# Patient Record
Sex: MALE | Race: BLACK OR AFRICAN AMERICAN
[De-identification: names, ages, dates, MRNs, and addresses within clinical notes are randomized per-mention and may not be internally consistent; named-entity substitution may affect disease eponyms.]

---

## 2020-09-17 ENCOUNTER — HOSPITAL ENCOUNTER (OUTPATIENT)
Dept: HOSPITAL 67 - RESP | Age: 60
Discharge: HOME | End: 2020-09-17
Attending: INTERNAL MEDICINE
Payer: COMMERCIAL

## 2020-09-17 DIAGNOSIS — R94.31: ICD-10-CM

## 2020-09-17 DIAGNOSIS — R06.02: ICD-10-CM

## 2020-09-17 DIAGNOSIS — R74.0: ICD-10-CM

## 2020-09-17 DIAGNOSIS — R07.9: Primary | ICD-10-CM

## 2020-09-17 DIAGNOSIS — R01.1: ICD-10-CM

## 2020-10-13 ENCOUNTER — HOSPITAL ENCOUNTER (OUTPATIENT)
Dept: HOSPITAL 67 - RESP | Age: 60
Discharge: HOME | End: 2020-10-13
Attending: INTERNAL MEDICINE
Payer: COMMERCIAL

## 2020-10-13 DIAGNOSIS — R94.31: Primary | ICD-10-CM

## 2020-10-13 DIAGNOSIS — R07.9: ICD-10-CM

## 2021-03-17 ENCOUNTER — HOSPITAL ENCOUNTER (OUTPATIENT)
Dept: HOSPITAL 67 - INF | Age: 61
Discharge: HOME | End: 2021-03-17
Attending: INTERNAL MEDICINE
Payer: COMMERCIAL

## 2021-03-17 DIAGNOSIS — Z23: Primary | ICD-10-CM

## 2021-03-17 PROCEDURE — 96372 THER/PROPH/DIAG INJ SC/IM: CPT

## 2021-04-08 ENCOUNTER — HOSPITAL ENCOUNTER (OUTPATIENT)
Dept: HOSPITAL 67 - INF | Age: 61
Discharge: HOME | End: 2021-04-08
Attending: INTERNAL MEDICINE
Payer: COMMERCIAL

## 2021-04-08 DIAGNOSIS — Z23: Primary | ICD-10-CM

## 2021-05-10 ENCOUNTER — HOSPITAL ENCOUNTER (OUTPATIENT)
Dept: HOSPITAL 67 - MED/SURG | Age: 61
Setting detail: OBSERVATION
LOS: 1 days | Discharge: HOME | End: 2021-05-11
Attending: FAMILY MEDICINE | Admitting: FAMILY MEDICINE
Payer: COMMERCIAL

## 2021-05-10 VITALS — TEMPERATURE: 98.3 F | DIASTOLIC BLOOD PRESSURE: 70 MMHG | SYSTOLIC BLOOD PRESSURE: 166 MMHG

## 2021-05-10 VITALS — WEIGHT: 206.25 LBS | HEIGHT: 70 IN | BODY MASS INDEX: 29.53 KG/M2

## 2021-05-10 DIAGNOSIS — R07.89: Primary | ICD-10-CM

## 2021-05-10 DIAGNOSIS — I10: ICD-10-CM

## 2021-05-10 DIAGNOSIS — E11.9: ICD-10-CM

## 2021-05-10 DIAGNOSIS — Z20.822: ICD-10-CM

## 2021-05-10 DIAGNOSIS — R06.09: ICD-10-CM

## 2021-05-10 DIAGNOSIS — K21.9: ICD-10-CM

## 2021-05-10 DIAGNOSIS — R10.9: ICD-10-CM

## 2021-05-10 LAB
APTT BLD: 25 SECONDS (ref 24.5–33.6)
PLATELET # BLD: 139 K/UL (ref 142–355)
POTASSIUM SERPL-SCNC: 3.3 MMOL/L (ref 3.6–5.2)
SODIUM SERPL-SCNC: 137 MMOL/L (ref 136–145)

## 2021-05-10 PROCEDURE — G0378 HOSPITAL OBSERVATION PER HR: HCPCS

## 2021-05-10 PROCEDURE — 82746 ASSAY OF FOLIC ACID SERUM: CPT

## 2021-05-10 PROCEDURE — 36415 COLL VENOUS BLD VENIPUNCTURE: CPT

## 2021-05-10 PROCEDURE — 80053 COMPREHEN METABOLIC PANEL: CPT

## 2021-05-10 PROCEDURE — G0379 DIRECT REFER HOSPITAL OBSERV: HCPCS

## 2021-05-10 PROCEDURE — 83540 ASSAY OF IRON: CPT

## 2021-05-10 PROCEDURE — 80074 ACUTE HEPATITIS PANEL: CPT

## 2021-05-10 PROCEDURE — 82652 VIT D 1 25-DIHYDROXY: CPT

## 2021-05-10 PROCEDURE — 82306 VITAMIN D 25 HYDROXY: CPT

## 2021-05-10 PROCEDURE — 94760 N-INVAS EAR/PLS OXIMETRY 1: CPT

## 2021-05-10 PROCEDURE — 83735 ASSAY OF MAGNESIUM: CPT

## 2021-05-10 PROCEDURE — 82550 ASSAY OF CK (CPK): CPT

## 2021-05-10 PROCEDURE — 84425 ASSAY OF VITAMIN B-1: CPT

## 2021-05-10 PROCEDURE — 85027 COMPLETE CBC AUTOMATED: CPT

## 2021-05-10 PROCEDURE — 99220: CPT

## 2021-05-10 PROCEDURE — 82728 ASSAY OF FERRITIN: CPT

## 2021-05-10 PROCEDURE — 84100 ASSAY OF PHOSPHORUS: CPT

## 2021-05-10 PROCEDURE — 85610 PROTHROMBIN TIME: CPT

## 2021-05-10 PROCEDURE — 84484 ASSAY OF TROPONIN QUANT: CPT

## 2021-05-10 PROCEDURE — 82607 VITAMIN B-12: CPT

## 2021-05-10 PROCEDURE — 84446 ASSAY OF VITAMIN E: CPT

## 2021-05-10 PROCEDURE — 83036 HEMOGLOBIN GLYCOSYLATED A1C: CPT

## 2021-05-10 PROCEDURE — 85730 THROMBOPLASTIN TIME PARTIAL: CPT

## 2021-05-10 PROCEDURE — 93005 ELECTROCARDIOGRAM TRACING: CPT

## 2021-05-10 PROCEDURE — 83550 IRON BINDING TEST: CPT

## 2021-05-10 NOTE — NUR
PATIENT DIRECT ADMIT FROM  OFFICE BROUGHT VIA WHEELCHAIR. PATIENT
TAKEN TO ROOM 1117. PATIENT ORIENTED AND CALL LIGHT. FAMILY IS AT THE BEDSIDE.
BED LOCKED IN LOW POSITION, CALL BELL WITHIN REACH.

## 2021-05-10 NOTE — NUR
CALLED AND SPOKE WITH DR. CURTIS. ORDERS GIVEN FOR: BENADRYL 25MG IV X ONE
DOSE NOW; GI COCKTAIL X ONE DOSE NOW. LAB WORK AND X-RAYS REPORTED TO HER
ALSO. FURTHER ORDERS GIVEN FOR: VITAMIN D 5,000 DAILY TO START IN THE AM;
START ELECTROLYE PROTOCOL FOR HYPOKALEMIA; REPEAT CBC, CMP, MG AND PHOSPHORUS
WITH AM CARDIACS. ALL ORDERS READ BACK AND VERIFIED BY DR. CURTIS. ALL
MEDICATION ORDERS FAXED TO PHARMACY.

## 2021-05-10 NOTE — NUR
ENTERED PATIENT'S ROOM AT THIS TIME. PATIENT SITTING UP IN BED. HE STATES THAT
HE NEEDS SOMETHING TO HELP HIM REST TONIGHT. HE HASN'T BEEN RESTING LATELY. I
ASKED IF HE FEELS NERVOUS OR ANXIOUS- HE STATES YES. WHEN ASKED ABOUT PAIN, HE
POINTS TO THE MID-ABDOMINAL AND EPIGASTRIC AREA. HE REPORTS FEELING TIGHTNESS
AND WHAT FEELS LIKE GAS. HE GETS NAUSEOUS FROM TIME TO TIME. WATER AND ICE
GIVEN TO PATIENT- ENCOURAGED TO TAKE SMALL SIPS AT A TIME. I INFORMED PATIENT
THAT I WOULD CHECK WITH DR. CURTIS REGARDING SOMETHING TO HELP HIME SLEEP. HE
VERBALIZED UNDERSTANDING OF THE ABOVE. AGAIN EDUCATED ABOUT CALL LIGHT AND
SURROUNDINGS. CALL LIGHT WITHIN REACH.

## 2021-05-11 VITALS — SYSTOLIC BLOOD PRESSURE: 164 MMHG | DIASTOLIC BLOOD PRESSURE: 80 MMHG | TEMPERATURE: 97.8 F

## 2021-05-11 VITALS — SYSTOLIC BLOOD PRESSURE: 149 MMHG | DIASTOLIC BLOOD PRESSURE: 74 MMHG | TEMPERATURE: 97.8 F

## 2021-05-11 VITALS — SYSTOLIC BLOOD PRESSURE: 151 MMHG | TEMPERATURE: 98.3 F | DIASTOLIC BLOOD PRESSURE: 66 MMHG

## 2021-05-11 VITALS — DIASTOLIC BLOOD PRESSURE: 59 MMHG | SYSTOLIC BLOOD PRESSURE: 141 MMHG | TEMPERATURE: 98.2 F

## 2021-05-11 VITALS — DIASTOLIC BLOOD PRESSURE: 81 MMHG | TEMPERATURE: 98.3 F | SYSTOLIC BLOOD PRESSURE: 172 MMHG

## 2021-05-11 LAB
PLATELET # BLD: 126 K/UL (ref 142–355)
POTASSIUM SERPL-SCNC: 3.9 MMOL/L (ref 3.6–5.2)
SODIUM SERPL-SCNC: 137 MMOL/L (ref 136–145)

## 2021-05-11 NOTE — NUR
CONTACTED PCP ABOUT NITRO ADMINISTRATION TO CLARIFY IF ADMINISTRATION SHOULD
BE CONTINUED. PT STATED THAT HE HAS HAD NO CHEST PAIN BUT IT IS A LITTLE
TIGHT. WHEN ASKED IF IT FEELS LIKE PRESSURE OR HARD TO BREATH, PT STATED THAT
IT IS HARD TO BREATHE AND THAT IT COMES AND GOES BUT HAS BEEN BETTER WHILE IN
THE HOSPITAL. WRITER ALSO INFORMED PCP THAT PT IS EXPERIENCING DIZZINESS WHEN
GETTING OUT OF THE BED. PCP HAD PT ROTATE HIS HEAD QUICKLY PT STATED
THAT IT ONLY MADE HIM A LITTLE DIZZY. THEN HE ROTATED HIS HEAD QUICKLY AND
STOOD UP,  PT'S EYES BEGAN TO TWITCH SLIGHTLY AND PT SEEMED DIZZY BUT DENIED
THAT HE WAS. ORTHOSTATICS WERE PERFORMED WITHOUT ANY SIGNIFICANT RESULTS THAT
WOULD INDICATE ORTHOSTATIC HYPOTENSION.

## 2021-05-11 NOTE — NUR
PATIENT RESTING QUIETLY IN BED WITH EYES CLOSED. NAD NOTED. RESPIRATIONS EVEN
AND UNLABORED. HE STATES THAT HE IS RESTING WELL AND DENIES ANY PAIN AT THIS
TIME. NITROGLYCERIN NOT GIVEN AT THIS TIME. BED LOCKED AND IN LOWEST POSITION.
CALL LIGHT WITHIN REACH.

## 2021-05-11 NOTE — NUR
IN TO CHECK ON PATIENT. HE IS RESTING QUIETLY IN BED WITH EYES CLOSED. HE
STATES, "MY CHEST IS NOT HURTING, I JUST FEEL LIKE I NEED TO CLEAR IT."
PATIENT COUGHING AND STATES THAT IT HELPS SOME. NAD NOTED. PREVIOUS EKG SHOWED
SINUS BRADYCARDIA. NITROGLYCERIN NOT GIVEN AT THIS TIME. BED LOCKED AND IN
LOWEST POSITION. CALL LIGHT WITHIN REACH.

## 2021-05-11 NOTE — NUR
DISCHARGE INSTRUCTIONS WERE GIVEN TO PT AND PT'S DAUGHTER. BOTH VERBALIZED
UNDERSTANDING OF DISCHARGE INSTRUCTIONS. PT WAS DISCHARGED FROM THE HOSPITAL
VIA WHEELCHAIR TO DAUGHTER'S PERSONAL VEHICLE. IV WAS REMOVED BEFORE DISMISSAL
WITHOUT DIFFICULTY AND WITH CATHETER STILL INTACT. NAD WAS NOTED DURING
DISCHARGE FROM THE HOSPITAL.

## 2021-05-11 NOTE — NUR
PT EXPRESSED COMPLAINT ABOUT FEELING NAUSEATED, PRN PROMETHAZINE HAS BEEN
ADMINISTERED AND PT IS CURRENTLY ASLEEP.

## 2021-05-12 ENCOUNTER — HOSPITAL ENCOUNTER (OUTPATIENT)
Dept: HOSPITAL 67 - US | Age: 61
Discharge: HOME | End: 2021-05-12
Attending: FAMILY MEDICINE
Payer: COMMERCIAL

## 2021-05-12 DIAGNOSIS — R10.13: Primary | ICD-10-CM

## 2021-06-16 ENCOUNTER — HOSPITAL ENCOUNTER (OUTPATIENT)
Dept: HOSPITAL 67 - OR | Age: 61
Discharge: HOME | End: 2021-06-16
Attending: INTERNAL MEDICINE
Payer: COMMERCIAL

## 2021-06-16 VITALS — HEIGHT: 60 IN | BODY MASS INDEX: 0.19 KG/M2 | WEIGHT: 1 LBS

## 2021-06-16 DIAGNOSIS — K29.50: ICD-10-CM

## 2021-06-16 DIAGNOSIS — R11.2: ICD-10-CM

## 2021-06-16 DIAGNOSIS — Z20.822: ICD-10-CM

## 2021-06-16 DIAGNOSIS — K21.00: Primary | ICD-10-CM

## 2021-06-16 DIAGNOSIS — R19.7: ICD-10-CM

## 2021-06-16 PROCEDURE — 0DB88ZZ EXCISION OF SMALL INTESTINE, VIA NATURAL OR ARTIFICIAL OPENING ENDOSCOPIC: ICD-10-PCS | Performed by: INTERNAL MEDICINE

## 2021-06-16 PROCEDURE — 87635 SARS-COV-2 COVID-19 AMP PRB: CPT

## 2021-06-16 PROCEDURE — U0003 INFECTIOUS AGENT DETECTION BY NUCLEIC ACID (DNA OR RNA); SEVERE ACUTE RESPIRATORY SYNDROME CORONAVIRUS 2 (SARS-COV-2) (CORONAVIRUS DISEASE [COVID-19]), AMPLIFIED PROBE TECHNIQUE, MAKING USE OF HIGH THROUGHPUT TECHNOLOGIES AS DESCRIBED BY CMS-2020-01-R: HCPCS

## 2021-06-16 PROCEDURE — 0DB68ZZ EXCISION OF STOMACH, VIA NATURAL OR ARTIFICIAL OPENING ENDOSCOPIC: ICD-10-PCS | Performed by: INTERNAL MEDICINE

## 2021-07-21 ENCOUNTER — HOSPITAL ENCOUNTER (OUTPATIENT)
Dept: HOSPITAL 67 - NM | Age: 61
Discharge: HOME | End: 2021-07-21
Attending: INTERNAL MEDICINE
Payer: COMMERCIAL

## 2021-07-21 DIAGNOSIS — R11.0: Primary | ICD-10-CM

## 2021-07-21 PROCEDURE — A9541 TC99M SULFUR COLLOID: HCPCS

## 2021-11-17 ENCOUNTER — HOSPITAL ENCOUNTER (INPATIENT)
Dept: HOSPITAL 67 - ED | Age: 61
LOS: 1 days | Discharge: ANOTHER HOSPITAL | DRG: 395 | End: 2021-11-18
Attending: FAMILY MEDICINE | Admitting: FAMILY MEDICINE
Payer: COMMERCIAL

## 2021-11-17 VITALS — TEMPERATURE: 98.9 F

## 2021-11-17 VITALS — SYSTOLIC BLOOD PRESSURE: 158 MMHG | DIASTOLIC BLOOD PRESSURE: 61 MMHG | TEMPERATURE: 101 F

## 2021-11-17 VITALS — HEIGHT: 70 IN | WEIGHT: 207.19 LBS | BODY MASS INDEX: 29.66 KG/M2 | BODY MASS INDEX: 29.66 KG/M2

## 2021-11-17 DIAGNOSIS — I10: ICD-10-CM

## 2021-11-17 DIAGNOSIS — K59.00: ICD-10-CM

## 2021-11-17 DIAGNOSIS — K21.9: ICD-10-CM

## 2021-11-17 DIAGNOSIS — R73.03: ICD-10-CM

## 2021-11-17 DIAGNOSIS — K61.1: Primary | ICD-10-CM

## 2021-11-17 DIAGNOSIS — E78.49: ICD-10-CM

## 2021-11-17 DIAGNOSIS — R50.9: ICD-10-CM

## 2021-11-17 DIAGNOSIS — K57.30: ICD-10-CM

## 2021-11-17 LAB
PLATELET # BLD: 197 K/UL (ref 142–355)
POTASSIUM SERPL-SCNC: 3.5 MMOL/L (ref 3.6–5.2)
SODIUM SERPL-SCNC: 131 MMOL/L (ref 136–145)

## 2021-11-17 PROCEDURE — 80053 COMPREHEN METABOLIC PANEL: CPT

## 2021-11-17 PROCEDURE — U0003 INFECTIOUS AGENT DETECTION BY NUCLEIC ACID (DNA OR RNA); SEVERE ACUTE RESPIRATORY SYNDROME CORONAVIRUS 2 (SARS-COV-2) (CORONAVIRUS DISEASE [COVID-19]), AMPLIFIED PROBE TECHNIQUE, MAKING USE OF HIGH THROUGHPUT TECHNOLOGIES AS DESCRIBED BY CMS-2020-01-R: HCPCS

## 2021-11-17 PROCEDURE — 87040 BLOOD CULTURE FOR BACTERIA: CPT

## 2021-11-17 PROCEDURE — 83036 HEMOGLOBIN GLYCOSYLATED A1C: CPT

## 2021-11-17 PROCEDURE — 36415 COLL VENOUS BLD VENIPUNCTURE: CPT

## 2021-11-17 PROCEDURE — 81000 URINALYSIS NONAUTO W/SCOPE: CPT

## 2021-11-17 PROCEDURE — 85027 COMPLETE CBC AUTOMATED: CPT

## 2021-11-17 PROCEDURE — 99283 EMERGENCY DEPT VISIT LOW MDM: CPT

## 2021-11-17 PROCEDURE — 87635 SARS-COV-2 COVID-19 AMP PRB: CPT

## 2021-11-18 VITALS — SYSTOLIC BLOOD PRESSURE: 93 MMHG | DIASTOLIC BLOOD PRESSURE: 35 MMHG | TEMPERATURE: 98.4 F

## 2021-11-18 VITALS — SYSTOLIC BLOOD PRESSURE: 132 MMHG | TEMPERATURE: 98.1 F | DIASTOLIC BLOOD PRESSURE: 51 MMHG

## 2021-11-18 VITALS — SYSTOLIC BLOOD PRESSURE: 119 MMHG | DIASTOLIC BLOOD PRESSURE: 57 MMHG | TEMPERATURE: 98.2 F

## 2021-11-18 VITALS — TEMPERATURE: 97.8 F | SYSTOLIC BLOOD PRESSURE: 118 MMHG | DIASTOLIC BLOOD PRESSURE: 54 MMHG

## 2021-11-18 VITALS — TEMPERATURE: 103 F | DIASTOLIC BLOOD PRESSURE: 56 MMHG | SYSTOLIC BLOOD PRESSURE: 136 MMHG

## 2021-11-18 LAB
PLATELET # BLD: 195 K/UL (ref 142–355)
POTASSIUM SERPL-SCNC: 3.8 MMOL/L (ref 3.6–5.2)
SODIUM SERPL-SCNC: 135 MMOL/L (ref 136–145)

## 2021-12-28 ENCOUNTER — HOSPITAL ENCOUNTER (OUTPATIENT)
Dept: HOSPITAL 67 - RAD | Age: 61
Discharge: HOME | End: 2021-12-28
Attending: FAMILY MEDICINE
Payer: COMMERCIAL

## 2021-12-28 DIAGNOSIS — R10.9: Primary | ICD-10-CM

## 2021-12-28 DIAGNOSIS — S39.91XA: ICD-10-CM
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
## 2022-03-21 ENCOUNTER — HOSPITAL ENCOUNTER (OUTPATIENT)
Dept: HOSPITAL 67 - US | Age: 62
Discharge: HOME | End: 2022-03-21
Attending: FAMILY MEDICINE
Payer: COMMERCIAL

## 2022-03-21 DIAGNOSIS — R42: ICD-10-CM

## 2022-03-21 DIAGNOSIS — R51.9: ICD-10-CM

## 2022-03-21 DIAGNOSIS — H93.13: Primary | ICD-10-CM

## 2022-03-21 DIAGNOSIS — I10: ICD-10-CM

## 2022-11-21 ENCOUNTER — HOSPITAL ENCOUNTER (OUTPATIENT)
Dept: HOSPITAL 67 - RAD | Age: 62
Discharge: HOME | End: 2022-11-21
Attending: FAMILY MEDICINE
Payer: COMMERCIAL

## 2022-11-21 DIAGNOSIS — M54.89: Primary | ICD-10-CM

## 2022-11-21 DIAGNOSIS — M25.512: ICD-10-CM
